# Patient Record
Sex: MALE | Race: BLACK OR AFRICAN AMERICAN | NOT HISPANIC OR LATINO | URBAN - METROPOLITAN AREA
[De-identification: names, ages, dates, MRNs, and addresses within clinical notes are randomized per-mention and may not be internally consistent; named-entity substitution may affect disease eponyms.]

---
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
## 2024-03-14 NOTE — ED PEDIATRIC NURSE NOTE - BREATHING, MLM
Refill Requested:   Disp Refills Start End    QUEtiapine (SEROquel) 25 MG tablet 45 tablet 1 11/21/2023 --    Sig - Route: Take 0.5 tablets by mouth nightly. - Oral       11/21/23 note verified, refill approved  Follow Up:6mo   No Show/Cancel:None  Next visit:Visit date not found    Reorder due it it printing, re sending e-prescribed      , please sign  Order is sending under last prescriber Alaina Coy, SYLVIA.    Spontaneous, unlabored and symmetrical